# Patient Record
(demographics unavailable — no encounter records)

---

## 2025-04-23 NOTE — REVIEW OF SYSTEMS
[Fever] : no fever [Chills] : no chills [Chest Pain] : no chest pain [Shortness Of Breath] : no shortness of breath [Cough] : no cough [FreeTextEntry7] : as per HPI

## 2025-04-23 NOTE — ASSESSMENT
[FreeTextEntry1] : Abdominal pain - vitals stable - patient reports symptoms are improving, however, had pain/tenderness upon palpation of abdomen - will check blood work, blood drawn in office - will refer patient for CT abdomen/pelvis to r/o colitis, r/o appendicitis   Back pain - appears to have muscle spasm on examination - can c/w heat to affected area  - can c/w Ibuprofen for pain PRN   can go to the ER if symptoms worsen and/or become severe recommend she schedule and return for an annual physical

## 2025-04-23 NOTE — PHYSICAL EXAM
[No Acute Distress] : no acute distress [Well Nourished] : well nourished [Well Developed] : well developed [Normal Appearance] : was normal in appearance [Neck Supple] : was supple [No Respiratory Distress] : no respiratory distress  [Clear to Auscultation] : lungs were clear to auscultation bilaterally [Normal Rate] : normal rate  [Regular Rhythm] : with a regular rhythm [Normal S1, S2] : normal S1 and S2 [No Murmur] : no murmur heard [No Edema] : there was no peripheral edema [Soft] : abdomen soft [Non-distended] : non-distended [Normal Bowel Sounds] : normal bowel sounds [No Spinal Tenderness] : no spinal tenderness [Alert and Oriented x3] : oriented to person, place, and time [de-identified] : + tenderness to palpation of abdomen, right>left  [de-identified] : + muscle tightness to lower back, lower thoracic/lumbar region

## 2025-04-23 NOTE — HISTORY OF PRESENT ILLNESS
[FreeTextEntry8] : 59 year old female with h/o Hyperlipidemia presents for evaluation after an episode of low back pain and lower abdominal pain. She states she developed pain to both her lower abdomen and lower back around 11 am yesterday morning. The pain progressively worsened through out the day. She denies any fever or chills. No nausea, vomiting, or diarrhea. She has felt constipated over the past 2 days but reports having a normal BM yesterday. She denies hematuria. Her appetite has decreased. She reports looking pale yesterday. She left work early and came home. She rested, applied a heating pad to both her back and lower abdomen and took Ibuprofen. Today her symptoms have improved. She does feel "out of it" but her pain has subsided. She ate some cereal and drank coffee earlier today.

## 2025-04-23 NOTE — HEALTH RISK ASSESSMENT
[0] : 2) Feeling down, depressed, or hopeless: Not at all (0) [PHQ-2 Negative - No further assessment needed] : PHQ-2 Negative - No further assessment needed [Former] : Former [[id number]] : 0

## 2025-04-23 NOTE — HEALTH RISK ASSESSMENT
[0] : 2) Feeling down, depressed, or hopeless: Not at all (0) [PHQ-2 Negative - No further assessment needed] : PHQ-2 Negative - No further assessment needed [Former] : Former [[id number]] : 0

## 2025-04-23 NOTE — PHYSICAL EXAM
[No Acute Distress] : no acute distress [Well Nourished] : well nourished [Well Developed] : well developed [Normal Appearance] : was normal in appearance [Neck Supple] : was supple 160.02 [No Respiratory Distress] : no respiratory distress  [Clear to Auscultation] : lungs were clear to auscultation bilaterally [Normal Rate] : normal rate  [Regular Rhythm] : with a regular rhythm [Normal S1, S2] : normal S1 and S2 [No Murmur] : no murmur heard [No Edema] : there was no peripheral edema [Soft] : abdomen soft [Non-distended] : non-distended [Normal Bowel Sounds] : normal bowel sounds [No Spinal Tenderness] : no spinal tenderness [Alert and Oriented x3] : oriented to person, place, and time [de-identified] : + tenderness to palpation of abdomen, right>left  [de-identified] : + muscle tightness to lower back, lower thoracic/lumbar region